# Patient Record
Sex: MALE | Race: WHITE
[De-identification: names, ages, dates, MRNs, and addresses within clinical notes are randomized per-mention and may not be internally consistent; named-entity substitution may affect disease eponyms.]

---

## 2017-06-05 ENCOUNTER — HOSPITAL ENCOUNTER (EMERGENCY)
Dept: HOSPITAL 25 - ED | Age: 22
Discharge: HOME | End: 2017-06-05
Payer: COMMERCIAL

## 2017-06-05 VITALS — SYSTOLIC BLOOD PRESSURE: 135 MMHG | DIASTOLIC BLOOD PRESSURE: 75 MMHG

## 2017-06-05 DIAGNOSIS — S43.004A: Primary | ICD-10-CM

## 2017-06-05 DIAGNOSIS — S42.291A: ICD-10-CM
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
## 2017-06-05 PROCEDURE — 96375 TX/PRO/DX INJ NEW DRUG ADDON: CPT

## 2017-06-05 PROCEDURE — 99282 EMERGENCY DEPT VISIT SF MDM: CPT

## 2017-06-05 PROCEDURE — 96374 THER/PROPH/DIAG INJ IV PUSH: CPT

## 2017-06-05 NOTE — RAD
INDICATION:  Right shoulder dislocation status post external reduction.



COMPARISON: Comparison is made with a prior x-ray study of the right shoulder of the same

day.



TECHNIQUE: A single view of the right shoulder was obtained.



FINDINGS:  The bones are in normal alignment.  There is a Hill-Sachs fracture present. No

other fractures are seen.



IMPRESSION:  

1. STATUS POST EXTERNAL REDUCTION. THE BONES ARE IN NORMAL ALIGNMENT.

2. HILL-SACHS FRACTURE.

## 2017-06-05 NOTE — RAD
INDICATION:  Right shoulder injury.



TECHNIQUE: 3 views of the right shoulder were obtained.



FINDINGS:  There is anterior subcoracoid dislocation of the humeral head. There is

flattening of the superolateral aspect of the humeral head consistent with a Hill-Sachs

fracture.  



IMPRESSION:  

1. ANTERIOR DISLOCATION OF THE HUMERUS.

2. HILL-SACHS FRACTURE.

## 2017-06-14 NOTE — ED
Upper Extremity Pain





- HPI Summary


HPI Summary: 





Pt here w/ Rt shoulder deformity and pain while playing basketball - went up to 

block a shot with his Rt arm and met resistance, ultimately hurting his arm. 

Denies numbness, tingling, weakness but has a good bit of pain in shoulder and 

arm feels weird. Denies h/o dislocation or injury here in the past. Has not 

tried anything prior to arrival. Came by ambulance and received fentanyl which 

helped pain a bit. Denies other injuries at this time. 





- History of Current Complaint


Chief Complaint: EDExtremityUpper


Stated Complaint: RT SHOULDER INJURY


Time Seen by Provider: 06/05/17 20:24


Hx Obtained From: Patient





PMH/Surg Hx/FS Hx/Imm Hx


Previously Healthy: Yes


Endocrine/Hematology History: 


   Denies: Hx Anticoagulant Therapy, Hx Blood Disorders, Hx Unexplained Bleeding


Infectious Disease History: 


   Denies: Traveled Outside the [place] in Last 30 Days





- Family History


Known Family History: Positive: None





- Social History


Alcohol Use: Weekly


Hx Substance Use: Yes


Substance Use Type: Reports: Marijuana


Hx Tobacco Use: No


Smoking Status (MU): Never Smoked Tobacco





Review of Systems


Constitutional: Negative


Negative: Fatigue


Negative: Chest Pain


Negative: Shortness Of Breath


Negative: Vomiting, Nausea


Positive: no symptoms reported


Musculoskeletal: Other - see HPI


Skin: Negative


Negative: Bruising


Neurological: Negative


Psychological: Normal


All Other Systems Reviewed And Are Negative: Yes





Physical Exam


Triage Information Reviewed: Yes


Vital Signs On Initial Exam: 


 Initial Vitals











Resp


 


 18 


 


 06/05/17 20:32











Vital Signs Reviewed: Yes


Appearance: Positive: Well-Appearing, Well-Nourished, Pain Distress


Skin: Positive: Warm, Dry - no overlying erythema or ecchymosis


Head/Face: Positive: Normal Head/Face Inspection


Eyes: Positive: Normal, EOMI


ENT: Positive: Hearing grossly normal


Respiratory/Lung Sounds: Positive: Breath Sounds Present


Cardiovascular: Positive: Normal, Pulses are Symmetrical in both Upper and 

Lower Extremities


Musculoskeletal: Positive: Limited @ - Rt shoulder appears dislocation - 

prominent AC joint w/ gapping just inferior and delt appears sunken-in; limited 

ROM Rt shoulder, elbow, wrist, fingers


Neurological: Positive: Normal, Sensory/Motor Intact, Alert, Oriented to Person 

Place, Time, CN Intact II-III


Psychiatric: Positive: Normal





- [place] Coma Scale


Coma Scale Total: 15





Procedures





- Joint Reduction


Joint Reduction Site: shoulder (R)


Conscious Sedation: No - fentanyl and valium


Reduction Attempts: 1


Pre-Procedure NV Exam: Yes - WNL


Post Joint Reduction Film: joint reduced - NV intact





Diagnostics





- Vital Signs


 Vital Signs











  Pulse Resp BP


 


 06/05/17 22:33  51  14  135/75


 


 06/05/17 21:00   16 


 


 06/05/17 20:32   18 














- Laboratory


Lab Statement: Any lab studies that have been ordered have been reviewed, and 

results considered in the medical decision making process.





Re-Evaluation





- Re-Evaluation


  ** First Eval


Change: Improved - pt placed in shoulder immobilizer immediately after 

reduction which was successful per clinical and XR exams





Course/Dx





- Diagnoses


Provider Diagnoses: 


 Dislocation of right shoulder joint, Hill-Sachs fracture of right humerus








Discharge





- Discharge Plan


Condition: Stable


Disposition: HOME


Prescriptions: 


HYDROcodone/ACETAMIN 5-325 MG* [Norco 5-325 TAB*] 1 tab PO Q6H PRN #12 tab MDD 4


 PRN Reason: Pain


Ibuprofen TAB* [Motrin TAB* 600 MG] 600 mg PO Q6H PRN #20 tab


 PRN Reason: Pain


Patient Education Materials:  Shoulder Dislocation (ED), Proximal Humerus 

Fracture (ED)


Forms:  *Work Release


Referrals: 


Bradly Guan MD [Medical Doctor] - 


Additional Instructions: 


You presented tonight with a shoulder dislocation and fracture. Your 

dislocation was put back into place - it is important that you keep your arm in 

the immobilizer you are in until you are seen by orthopedics to prevent repeat 

of dislocation. You may ice, take ibuprofen and acetaminophen for pain - if 

pain disrupts your sleep, you may take norco (only take as directed). Follow-up 

with orthopedics this week - call tomorrow to schedule an appointment.


*If you develop numbness, tingling or weakness in your extremity, return to ED

## 2019-02-23 ENCOUNTER — HOSPITAL ENCOUNTER (EMERGENCY)
Dept: HOSPITAL 25 - ED | Age: 24
Discharge: HOME | End: 2019-02-23
Payer: COMMERCIAL

## 2019-02-23 VITALS — DIASTOLIC BLOOD PRESSURE: 88 MMHG | SYSTOLIC BLOOD PRESSURE: 119 MMHG
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
## 2019-02-23 DIAGNOSIS — S43.004A: Primary | ICD-10-CM
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
## 2019-02-23 PROCEDURE — 23650 CLTX SHO DSLC W/MNPJ WO ANES: CPT

## 2019-02-23 PROCEDURE — 99282 EMERGENCY DEPT VISIT SF MDM: CPT

## 2019-02-23 NOTE — ED
Upper Extremity Pain





- HPI Summary


HPI Summary: 





This patient is a 24 year old M presenting to [facility] accompanied by a male with 

a chief complaint of right shoulder pain and deformity since just PTA. The 

patient rates the pain 9/10 in severity. Denies fever. The patient states that 

the last time this happened, it went back in immediately. The patient was 

playing basketball when the dislocation occurred. PMHX two other right shoulder 

dislocations. 





- History of Current Complaint


Chief Complaint: EDShoulderClavicleInj


Stated Complaint: SHOULDER INJURY RIGHT


Time Seen by Provider: 02/23/19 11:47


Hx Obtained From: Patient


Onset/Duration: Started Minutes Ago


Timing: Constant


Severity Currently: Severe - 9/10


Pain Location: Shoulder - right


Related History: Similar Episode/Dx As - two past dislocations





- Allergies/Home Medications


Allergies/Adverse Reactions: 


 Allergies











Allergy/AdvReac Type Severity Reaction Status Date / Time


 


No Known Allergies Allergy   Verified 02/23/19 11:53











Home Medications: 


 Home Medications





NK [No Home Medications Reported]  02/23/19 [History Confirmed 02/23/19]











PMH/Surg Hx/FS Hx/Imm Hx


Endocrine/Hematology History: 


   Denies: Hx Anticoagulant Therapy, Hx Blood Disorders, Hx Unexplained Bleeding


Musculoskeletal History: Reports: Other Musculoskeletal History - 3x shoulder 

dislocations


Infectious Disease History: No


Infectious Disease History: 


   Denies: Traveled Outside the [place] in Last 30 Days





- Family History


Known Family History: 


   Negative: Blood Disorder





- Social History


Alcohol Use: Weekly


Hx Substance Use: Yes


Substance Use Type: Reports: Marijuana


Hx Tobacco Use: No


Smoking Status (MU): Never Smoked Tobacco





Review of Systems


Negative: Fever


Positive: Decreased ROM - right shoulder


All Other Systems Reviewed And Are Negative: Yes





Physical Exam





- Summary


Physical Exam Summary: 





Appearance: Well appearing, no pain distress


Skin: warm, dry, reflects adequate perfusion


Head/face: normal


Right shoulder: deformity with tenderness. ROM restricted. No neurovascular 

deficit of the arm. 


Eyes: EOMI, KATIE


ENT: normal


Neck: supple, non-tender


Respiratory: CTA, breath sounds present


Cardiovascular: RRR, pulses symmetrical  


Abdomen: non-tender, soft


Musculoskeletal: normal, strength/ROM intact everywhere except right shoulder


Neuro: normal, sensory motor intact, A&Ox3





Triage Information Reviewed: Yes


Vital Signs On Initial Exam: 


 Initial Vitals











Temp Pulse Resp BP Pulse Ox


 


 97.7 F   115   20   115/90   97 


 


 02/23/19 11:18  02/23/19 11:18  02/23/19 11:18  02/23/19 11:18  02/23/19 11:18











Vital Signs Reviewed: Yes





Procedures





- Procedure Summary


Procedure Summary: 





The right shoulder was successfully reduced with traction-countertraction. The 

patient refused pain medication. 





Diagnostics





- Vital Signs


 Vital Signs











  Temp Pulse Resp BP Pulse Ox


 


 02/23/19 11:18  97.7 F  115  20  115/90  97














- Laboratory


Lab Statement: Any lab studies that have been ordered have been reviewed, and 

results considered in the medical decision making process.





- Radiology


  ** Shoulder X Ray


Radiology Interpretation Completed By: Radiologist


Summary of Radiographic Findings: RIGHT SHOULDER DISLOCATION, REDUCED ON 

SUBSEQUENT IMAGING.  ED physician has reviewed this report.





Course/Dx





- Course


Course Of Treatment: This patient is a 24 year old M presenting to [facility] 

accompanied by a male with a chief complaint of right shoulder pain and 

deformity since just PTA. The patient rates the pain 9/10 in severity.  CXR 

reveals, per radiologist, RIGHT SHOULDER DISLOCATION, REDUCED ON SUBSEQUENT 

IMAGING.  ED physician has reviewed this report.  The shoulder was successfully 

reduced, the patient refused pain medication.  Patient will be discharged with 

follow up from Dr. Pace.  The patient is agreeable with this plan.





- Diagnoses


Provider Diagnoses: 


 Shoulder dislocation








Discharge





- Sign-Out/Discharge


Documenting (check all that apply): Patient Departure - discharge


Patient Received Moderate/Deep Sedation with Procedure: No





- Discharge Plan


Condition: Stable


Disposition: HOME


Patient Education Materials:  Shoulder Dislocation (ED)


Referrals: 


Nicole Pace MD [Medical Doctor] - 1 Week


Additional Instructions: 


Follow up with Dr. Pace in one week.





- Billing Disposition and Condition


Condition: STABLE


Disposition: Home





- Attestation Statements


Document Initiated by Leeanneibe: Yes


Documenting Scribe: Darrell Simpson


Provider For Whom Marisela is Documenting (Include Credential): Navid Figueroa MD


Scribe Attestation: 


Darrell LOPEZ scribed for Navid Figueroa MD on 02/23/19 at 1248. 


Scribe Documentation Reviewed: Yes


Provider Attestation: 


The documentation as recorded by the Darrell champion accurately reflects 

the service I personally performed and the decisions made by me, Navid Figueroa MD


Status of Scribe Document: Viewed